# Patient Record
Sex: FEMALE | Race: WHITE | NOT HISPANIC OR LATINO | Employment: FULL TIME | URBAN - METROPOLITAN AREA
[De-identification: names, ages, dates, MRNs, and addresses within clinical notes are randomized per-mention and may not be internally consistent; named-entity substitution may affect disease eponyms.]

---

## 2023-06-01 ENCOUNTER — TELEPHONE (OUTPATIENT)
Dept: PRIMARY CARE | Facility: CLINIC | Age: 43
End: 2023-06-01

## 2023-06-01 ENCOUNTER — OFFICE VISIT (OUTPATIENT)
Dept: PRIMARY CARE | Facility: CLINIC | Age: 43
End: 2023-06-01
Payer: COMMERCIAL

## 2023-06-01 VITALS — DIASTOLIC BLOOD PRESSURE: 82 MMHG | WEIGHT: 267.6 LBS | SYSTOLIC BLOOD PRESSURE: 124 MMHG | TEMPERATURE: 97.9 F

## 2023-06-01 DIAGNOSIS — R06.2 WHEEZING: Primary | ICD-10-CM

## 2023-06-01 PROCEDURE — 99202 OFFICE O/P NEW SF 15 MIN: CPT | Performed by: FAMILY MEDICINE

## 2023-06-01 PROCEDURE — 1036F TOBACCO NON-USER: CPT | Performed by: FAMILY MEDICINE

## 2023-06-01 RX ORDER — CETIRIZINE HYDROCHLORIDE 10 MG/1
10 TABLET ORAL DAILY
COMMUNITY

## 2023-06-01 RX ORDER — ALBUTEROL SULFATE 90 UG/1
2 AEROSOL, METERED RESPIRATORY (INHALATION) EVERY 4 HOURS PRN
Qty: 18 G | Refills: 1 | Status: SHIPPED | OUTPATIENT
Start: 2023-06-01 | End: 2024-01-23 | Stop reason: SDUPTHER

## 2023-06-01 RX ORDER — FERROUS GLUCONATE 325 MG
38 TABLET ORAL
COMMUNITY

## 2023-06-01 NOTE — PROGRESS NOTES
Subjective   Patient ID: celine Holloway is a 42 y.o. female who presents for New Patient Visit (NP. Here for refill on inhaler for reactive airway.).  HPI  PCP was Saranya Zamora - retired 5 years ago and has not seen a doctor since    Has wheezing and shortness of breath with exertion; has not had an albuterol inhaler to use     Exposed to second-hand smoke (BF)    SH: lives with BF and 10 y.o. daughter  Review of Systems  Genl: The patient has been in good health without recent weight change, fevers, or night sweats  CVS: No chest pain, irregular heartbeat, shortness of breath, or swollen ankles  Resp: No cough or difficulty breathing  GI: No change in bowel habits or abdominal pain. No heartburn      Objective   Visit Vitals  /82   Temp 36.6 °C (97.9 °F) (Oral)      Physical Exam  Alert, well-appearing.    HEENT: OP clear. Sclera white. Pupils equal and round. EACs and TMs clear.    Neck: Supple. No palpable masses. Thyroid was not enlarged.    CVS: RRR, no murmurs.     Respiratory: Normal inspirations and expirations. Clear and equal breath sounds.    GI: Soft, nontender, no masses or hepatosplenomegaly.     Assessment/Plan   Diagnoses and all orders for this visit:  Wheezing  -     Pulmonary function testing  -     albuterol (Ventolin HFA) 90 mcg/actuation inhaler; Inhale 2 puffs every 4 hours if needed for wheezing or shortness of breath.    Follow up for DMITRIY Bettencourt MD  Family Medicine   [facility]

## 2023-07-10 DIAGNOSIS — Z00.00 ANNUAL PHYSICAL EXAM: ICD-10-CM

## 2023-07-10 DIAGNOSIS — Z12.31 VISIT FOR SCREENING MAMMOGRAM: ICD-10-CM

## 2023-07-15 ENCOUNTER — LAB (OUTPATIENT)
Dept: LAB | Facility: LAB | Age: 43
End: 2023-07-15
Payer: COMMERCIAL

## 2023-07-15 DIAGNOSIS — Z00.00 ANNUAL PHYSICAL EXAM: ICD-10-CM

## 2023-07-15 LAB
ALANINE AMINOTRANSFERASE (SGPT) (U/L) IN SER/PLAS: 11 U/L (ref 7–45)
ALBUMIN (G/DL) IN SER/PLAS: 4 G/DL (ref 3.4–5)
ALKALINE PHOSPHATASE (U/L) IN SER/PLAS: 66 U/L (ref 33–110)
ANION GAP IN SER/PLAS: 8 MMOL/L (ref 10–20)
ASPARTATE AMINOTRANSFERASE (SGOT) (U/L) IN SER/PLAS: 12 U/L (ref 9–39)
BASOPHILS (10*3/UL) IN BLOOD BY AUTOMATED COUNT: 0.04 X10E9/L (ref 0–0.1)
BASOPHILS/100 LEUKOCYTES IN BLOOD BY AUTOMATED COUNT: 0.6 % (ref 0–2)
BILIRUBIN TOTAL (MG/DL) IN SER/PLAS: 0.5 MG/DL (ref 0–1.2)
CALCIUM (MG/DL) IN SER/PLAS: 8.9 MG/DL (ref 8.6–10.6)
CARBON DIOXIDE, TOTAL (MMOL/L) IN SER/PLAS: 27 MMOL/L (ref 21–32)
CHLORIDE (MMOL/L) IN SER/PLAS: 110 MMOL/L (ref 98–107)
CHOLESTEROL (MG/DL) IN SER/PLAS: 171 MG/DL (ref 0–199)
CHOLESTEROL IN HDL (MG/DL) IN SER/PLAS: 27.4 MG/DL
CHOLESTEROL/HDL RATIO: 6.2
CREATININE (MG/DL) IN SER/PLAS: 1.06 MG/DL (ref 0.5–1.05)
EOSINOPHILS (10*3/UL) IN BLOOD BY AUTOMATED COUNT: 0.24 X10E9/L (ref 0–0.7)
EOSINOPHILS/100 LEUKOCYTES IN BLOOD BY AUTOMATED COUNT: 3.4 % (ref 0–6)
ERYTHROCYTE DISTRIBUTION WIDTH (RATIO) BY AUTOMATED COUNT: 14.2 % (ref 11.5–14.5)
ERYTHROCYTE MEAN CORPUSCULAR HEMOGLOBIN CONCENTRATION (G/DL) BY AUTOMATED: 31.9 G/DL (ref 32–36)
ERYTHROCYTE MEAN CORPUSCULAR VOLUME (FL) BY AUTOMATED COUNT: 86 FL (ref 80–100)
ERYTHROCYTES (10*6/UL) IN BLOOD BY AUTOMATED COUNT: 4.22 X10E12/L (ref 4–5.2)
GFR FEMALE: 66 ML/MIN/1.73M2
GLUCOSE (MG/DL) IN SER/PLAS: 93 MG/DL (ref 74–99)
HEMATOCRIT (%) IN BLOOD BY AUTOMATED COUNT: 36.1 % (ref 36–46)
HEMOGLOBIN (G/DL) IN BLOOD: 11.5 G/DL (ref 12–16)
IMMATURE GRANULOCYTES/100 LEUKOCYTES IN BLOOD BY AUTOMATED COUNT: 0.1 % (ref 0–0.9)
LDL: 100 MG/DL (ref 0–99)
LEUKOCYTES (10*3/UL) IN BLOOD BY AUTOMATED COUNT: 7.1 X10E9/L (ref 4.4–11.3)
LYMPHOCYTES (10*3/UL) IN BLOOD BY AUTOMATED COUNT: 2.42 X10E9/L (ref 1.2–4.8)
LYMPHOCYTES/100 LEUKOCYTES IN BLOOD BY AUTOMATED COUNT: 33.9 % (ref 13–44)
MONOCYTES (10*3/UL) IN BLOOD BY AUTOMATED COUNT: 0.5 X10E9/L (ref 0.1–1)
MONOCYTES/100 LEUKOCYTES IN BLOOD BY AUTOMATED COUNT: 7 % (ref 2–10)
NEUTROPHILS (10*3/UL) IN BLOOD BY AUTOMATED COUNT: 3.92 X10E9/L (ref 1.2–7.7)
NEUTROPHILS/100 LEUKOCYTES IN BLOOD BY AUTOMATED COUNT: 55 % (ref 40–80)
NON HDL CHOLESTEROL: 144 MG/DL
NRBC (PER 100 WBCS) BY AUTOMATED COUNT: 0 /100 WBC (ref 0–0)
PLATELETS (10*3/UL) IN BLOOD AUTOMATED COUNT: 339 X10E9/L (ref 150–450)
POTASSIUM (MMOL/L) IN SER/PLAS: 4.4 MMOL/L (ref 3.5–5.3)
PROTEIN TOTAL: 6.4 G/DL (ref 6.4–8.2)
SODIUM (MMOL/L) IN SER/PLAS: 141 MMOL/L (ref 136–145)
THYROTROPIN (MIU/L) IN SER/PLAS BY DETECTION LIMIT <= 0.05 MIU/L: 2.81 MIU/L (ref 0.44–3.98)
TRIGLYCERIDE (MG/DL) IN SER/PLAS: 216 MG/DL (ref 0–149)
UREA NITROGEN (MG/DL) IN SER/PLAS: 11 MG/DL (ref 6–23)
VLDL: 43 MG/DL (ref 0–40)

## 2023-07-15 PROCEDURE — 84443 ASSAY THYROID STIM HORMONE: CPT

## 2023-07-15 PROCEDURE — 36415 COLL VENOUS BLD VENIPUNCTURE: CPT

## 2023-07-15 PROCEDURE — 85025 COMPLETE CBC W/AUTO DIFF WBC: CPT

## 2023-07-15 PROCEDURE — 80061 LIPID PANEL: CPT

## 2023-07-15 PROCEDURE — 80053 COMPREHEN METABOLIC PANEL: CPT

## 2023-07-17 ENCOUNTER — OFFICE VISIT (OUTPATIENT)
Dept: PRIMARY CARE | Facility: CLINIC | Age: 43
End: 2023-07-17
Payer: COMMERCIAL

## 2023-07-17 DIAGNOSIS — Z00.00 HEALTH CARE MAINTENANCE: Primary | ICD-10-CM

## 2023-07-17 DIAGNOSIS — Z12.4 CERVICAL CANCER SCREENING: ICD-10-CM

## 2023-07-17 PROCEDURE — 1036F TOBACCO NON-USER: CPT | Performed by: FAMILY MEDICINE

## 2023-07-17 PROCEDURE — 88175 CYTOPATH C/V AUTO FLUID REDO: CPT

## 2023-07-17 PROCEDURE — 99396 PREV VISIT EST AGE 40-64: CPT | Performed by: FAMILY MEDICINE

## 2023-07-17 NOTE — PROGRESS NOTES
Subjective   Patient ID: Eugenia Holloway is a 42 y.o. female who presents for Annual Exam (EP. Here for WWE and PAP. States having heel pain.).  HPI  Feels well, no specific complaints or concerns today.    Uses albuterol inhaler occasionally (has used it a handful of times like when went [place] zoo); had PFT done: minimal airway obstruction, no significant response after bronchodilator     Still has regular periods   Review of Systems  General: no fever or night sweats, no change in weight  Eyes: no vision disturbance  ENT: no mouth lesions, no sore throat, and no dysphagia  CV: no chest pain, no palpitations  Resp: no shortness of breath, no cough  GI: no abdominal pain, no change in bowel habits  : no urinary problems  MSK: no arthralgias, myalgias, or back pain  Skin; no rashes or new/changed skin lesions  Neuro: no headaches   Objective   There were no vitals taken for this visit.   Physical Exam  Appears well.     HEENT: OP clear. Sclera white. PERRL. EACs and TMs clear.     Neck: supple, no masses, or lymphadenopathy. No thyromegaly.     CVS: RRR. No murmurs. No peripheral edema.    Lungs: clear with normal inspirations and expirations.    Breasts: Symmetrical, no masses, no skin retraction or dimpling. No nipple discharge. No axillary nodes.    Abd: soft, NT, no masses or HSM.    Pelvic: No vulvar lesions. No vaginal lesions or discharge. Normal appearing cervix with no lesions. No adnexal masses. Normal uterus.    Rectal: No masses. Guaiac negative stool.    Skin: No suspicious lesions.    Assessment/Plan   Diagnoses and all orders for this visit:  Health care maintenance  Cervical cancer screening  -     THINPREP PAP TEST        Mary Bettencourt MD  Family Medicine   [facility]  
full weight-bearing

## 2023-07-19 LAB
COMPLETE PATHOLOGY REPORT: NORMAL
COMPLETE PATHOLOGY REPORT: NORMAL
CONVERTED CLINICAL DIAGNOSIS-HISTORY: NORMAL
CONVERTED DIAGNOSIS COMMENT: NORMAL
CONVERTED FINAL DIAGNOSIS: NORMAL
CONVERTED FINAL REPORT PDF LINK TO COPY AND PASTE: NORMAL

## 2024-01-09 DIAGNOSIS — R79.89 ABNORMAL CBC: ICD-10-CM

## 2024-01-09 DIAGNOSIS — R79.89 ELEVATED SERUM CREATININE: ICD-10-CM

## 2024-01-09 DIAGNOSIS — E78.2 ELEVATED TRIGLYCERIDES WITH HIGH CHOLESTEROL: ICD-10-CM

## 2024-01-09 PROBLEM — R42 VERTIGO: Status: ACTIVE | Noted: 2017-07-19

## 2024-01-09 PROBLEM — J45.909 REACTIVE AIRWAY DISEASE WITHOUT COMPLICATION (HHS-HCC): Status: ACTIVE | Noted: 2020-09-10

## 2024-01-23 ENCOUNTER — OFFICE VISIT (OUTPATIENT)
Dept: PRIMARY CARE | Facility: CLINIC | Age: 44
End: 2024-01-23
Payer: COMMERCIAL

## 2024-01-23 VITALS
DIASTOLIC BLOOD PRESSURE: 76 MMHG | TEMPERATURE: 97.7 F | SYSTOLIC BLOOD PRESSURE: 124 MMHG | WEIGHT: 264 LBS | HEART RATE: 74 BPM | OXYGEN SATURATION: 97 %

## 2024-01-23 DIAGNOSIS — J45.20 MILD INTERMITTENT ASTHMA WITHOUT COMPLICATION (HHS-HCC): ICD-10-CM

## 2024-01-23 DIAGNOSIS — E66.9 OBESITY (BMI 30-39.9): Primary | ICD-10-CM

## 2024-01-23 PROCEDURE — 99213 OFFICE O/P EST LOW 20 MIN: CPT | Performed by: FAMILY MEDICINE

## 2024-01-23 PROCEDURE — 1036F TOBACCO NON-USER: CPT | Performed by: FAMILY MEDICINE

## 2024-01-23 RX ORDER — ALBUTEROL SULFATE 90 UG/1
2 AEROSOL, METERED RESPIRATORY (INHALATION) EVERY 4 HOURS PRN
Qty: 18 G | Refills: 1 | Status: SHIPPED | OUTPATIENT
Start: 2024-01-23 | End: 2025-01-22

## 2024-01-23 NOTE — PROGRESS NOTES
Subjective   Patient ID: Eugenia Holloway is a 43 y.o. female who presents for Follow-up (EP. Here for 6 month follow up and medications.).  HPI  Feels well, no specific complaints or concerns today.    Saw podiatrist - dx'ed with plantar fasciitis, heel spurs bilaterally at insertion point of Achilles tendon    Had PFT done in June - showed minimal airway obstruction    Needed albuterol inhaler the other day doing laundry  Boyfriend smokes so this does not help her    Does not exercise     Review of Systems  Genl: The patient has been in good health without recent weight change, fevers, or night sweats  CVS: No chest pain, irregular heartbeat, shortness of breath, or swollen ankles  Resp: No cough or difficulty breathing  GI: No change in bowel habits or abdominal pain.     Objective   Visit Vitals  /76   Pulse 74   Temp 36.5 °C (97.7 °F) (Oral)      Physical Exam  Alert, well-appearing.    HEENT: OP clear. Sclera white. Pupils equal and round. EACs and TMs clear.    Neck: Supple. No palpable masses. Thyroid was not enlarged.    CVS: RRR, no murmurs. No peripheral edema.    Respiratory: Normal inspirations and expirations. Clear and equal breath sounds.    GI: Soft, nontender, no masses or hepatosplenomegaly.     Assessment/Plan   Diagnoses and all orders for this visit:  Obesity (BMI 30-39.9)  -     Referral to Nutrition Services; Future  Mild intermittent asthma without complication  -     albuterol (Ventolin HFA) 90 mcg/actuation inhaler; Inhale 2 puffs every 4 hours if needed for wheezing or shortness of breath.    Consider steroid inhaler if albuterol use is > 3x/week    Encouraged her to fit in regular exercise     Follow up 6 mos (WWE)    Mary Bettencourt MD  Family Medicine   [facility]

## 2024-04-16 ENCOUNTER — NUTRITION (OUTPATIENT)
Dept: NUTRITION | Facility: CLINIC | Age: 44
End: 2024-04-16
Payer: COMMERCIAL

## 2024-04-16 VITALS — HEIGHT: 70 IN | WEIGHT: 275.57 LBS | BODY MASS INDEX: 39.45 KG/M2

## 2024-04-16 DIAGNOSIS — E66.9 OBESITY (BMI 30-39.9): ICD-10-CM

## 2024-04-16 PROCEDURE — 97802 MEDICAL NUTRITION INDIV IN: CPT | Performed by: DIETITIAN, REGISTERED

## 2024-04-16 NOTE — PROGRESS NOTES
"Reason for Nutrition Visit:  Pt is a 43 y.o. female being seen at [place]. Pt was referred by Mary Bettencourt MD, effective April 16, 2024.     1. Obesity (BMI 30-39.9)  Referral to Nutrition Services         Past Medical Hx:  Patient Active Problem List   Diagnosis    Reactive airway disease without complication ([facility]-H[facility])    Vertigo        Current Outpatient Medications:     albuterol (Ventolin HFA) 90 mcg/actuation inhaler, Inhale 2 puffs every 4 hours if needed for wheezing or shortness of breath., Disp: 18 g, Rfl: 1    cetirizine (ZyrTEC) 10 mg tablet, Take 1 tablet (10 mg) by mouth once daily., Disp: , Rfl:     ferrous gluconate (Fergon) 324 (38 Fe) mg tablet, Take 1 tablet (38 mg of iron) by mouth once daily with breakfast., Disp: , Rfl:      Anthropometrics:  Anthropometrics  Height: 177.2 cm (5' 9.76\")  Weight: 125 kg (275 lb 9.2 oz)  BMI (Calculated): 39.81   Weight change:    Significant Weight Change: No    Lab Results   Component Value Date    CHOL 171 07/15/2023    LDLF 100 (H) 07/15/2023    TRIG 216 (H) 07/15/2023    HDL 27.4 (A) 07/15/2023        Chemistry    Lab Results   Component Value Date/Time     07/15/2023 0826    K 4.4 07/15/2023 0826     (H) 07/15/2023 0826    CO2 27 07/15/2023 0826    BUN 11 07/15/2023 0826    CREATININE 1.06 (H) 07/15/2023 0826    Lab Results   Component Value Date/Time    CALCIUM 8.9 07/15/2023 0826    ALKPHOS 66 07/15/2023 0826    AST 12 07/15/2023 0826    ALT 11 07/15/2023 0826    BILITOT 0.5 07/15/2023 0826         Food and Nutrition Hx:  Pt states she would like a meal plan for her whole house.   Pt wakes up at 6:00- 6:30 am.     24 Diet Recall:  Meal 1: Breakfast is at 8:30- 9:00 to include a bagel with margarine.   Energy can drop by 11:00.   Meal 2: Lunch is at 10:30 - 1:00 to include muffin and sandwich with lunch meat and cheese.   Snack may be a snack such as grapes with cheese.   Meal 3: Dinner is at 6:00- 7:00 to include chicken sandwich with 1 " "-1.5 cup of french fries  Snacks:  Beverages: A cup of coffee with cream and sugar and 54 ounces of water per day.     Allergies: None  Intolerance: Lactose  Appetite: Good  Intake: >75%  GI Symptoms : constipation Frequency: occasional  Swallowing Difficulty: No problems with swallowing  Dentition : own    Types of Activities: Mostly Sedentary    Sleep duration/quality : 1-4 hours and disrupted sleep  Sleep disorders: insomnia    Supplements: Iron daily    Feelings of Hunger?: Doesn't notice  Physical Feeling: Physically full and Sluggish  Binging: Never  Cravings: Sweet and Salty  Energy Levels: Fluctuates    Food Preparation: Patient and Partner/Spouse  Cooking Skills/Barriers: None reported  Grocery Shopping: Patient    Nutrition Focused Physical Exam:    Performed/Deferred: Deferred as pt visually appears well-nourished with no signs of malnutrition    Other Physical Findings:  Hair: None  None  Mouth: None  Skin: None  Nails: None  none    Malnutrition Present: No    Estimated Energy Needs:  Energy Needs  Calculated Energy Needs Using Equations  Height: 177.2 cm (5' 9.76\")  Weight Used for Equation Calculations: 125 kg (275 lb 9.2 oz)  Sergio Espinosa Equation (Overweight or Obese Patients): 1972  Equation Chosen to Use by RD: Toro Ma  Activity Factor: 1.2  Total Energy Needs: 2400  Estimated Energy Needs  Total Energy Estimated Needs (kCal): 1900 kCal     Nutrition Diagnosis:  Malnutrition Diagnosis     Patient has Malnutrition Diagnosis No   Nutrition Diagnosis     Patient has Nutrition Diagnosis Yes   Diagnosis Status (1) New   Nutrition Diagnosis 1 Food and nutrition related knowledge deficit   Related to (1) how to eat for wt loss with current BMI of 39   As Evidenced by (1) reports by pt of the need to learn.   Additional Nutrition Diagnosis Diagnosis 2   Diagnosis Status (2) New   Nutrition Diagnosis 2 Excessive energy intake   Related to (2) lack of meal and snack schedule and structure with " selection at times of high kcal foods   As Evidenced by (2) kcal intake exceeds calories recommended for wt loss.   Diagnosis Status (3) New   Nutrition Diagnosis 3 Altered nutrition related to laboratory values   Related to (3) limited intake of fruits, vegetables, and whole grain with higher amount of saturated fat due to food preferences of the family   As Evidenced by (3) LDL and TG levels has been elevated.     Nutrition Interventions:  Medical nutrition therapy was given for wt loss.   Nutrition Counseling: Motivational Interviewing and CBT  Coordination of Care: None    Nutrition Recommendation:  1,900 calories per day for 1 lb wt loss per week. Heart healthy meal plan with a low saturated fat intake to <5 -6 % of energy (less than 12 grams of saturated fat per day), reduced intake of added sugars (<10% of total energy), 25 grams of fiber with intake of viscous fiber to 5 g/day to 10 g/day, plant sterols/stanols to 2 g/day, and 1.1 gram of omega three fatty acids per day to reduce LDL/TG levels. 1,500 mg sodium restriction per day.     Nutrition Goals:  Via teach back method patient verbalized understanding of the following topics:  1) Eating three meals per day can increase the metabolism, this is called the thermal effect of eating. Eating three meals burns more calories than two meals consumed per day. Strive to consume breakfast within 1 -2 hours of waking to jump start the metabolism. Aim for breakfast at 8:30,  lunch at 12:30, snack at 3:00- 4:00, and dinner at 6:00 and a bedtime at 9:00 am.   2) Strive to include protein at the meals and even snacks. Protein at meals can increase the metabolism too.  Protein at snacks can sustain energy, stabilize blood glucose, and provide more contentment. Protein foods include eggs, egg whites, cheese, nuts, nut butters, poultry, meat, fish, tofu, plant based protein powder, and/or plant based protein drinks. Consider also [employer] products.   3) The plate method was  "recommended to help portion foods at meals and to structure. Using a 9\" plate, recommended for 1/2 of the plate to include non-starchy vegetable and suggested to consume this first.  Recommended protein to be included but limited to 3 ounces at meals. Recommended 1/4 of the plate or 1 cup of a grain or starch with a fruit, milk or yogurt at meals. For more information on the plate method visit [employer]gov.   4) The consumption certain beverages may provide additional calories.  To help with wt loss, strive to consume more Crystal Light, Wyler's, or fruit infused water with sparking or carbonated water.     Educational handout: Flores Pantoja MS, RDN, BIANCA, GRAHAM   Advanced Practice Clinical Dietitian  [email or web address]  Schedule line [phone]  Direct line [phone]     Readiness to Change : Good  Level of Understanding : Good  Anticipated Compliant : Good   "

## 2024-04-16 NOTE — PATIENT INSTRUCTIONS
"1) Eating three meals per day can increase the metabolism, this is called the thermal effect of eating. Eating three meals burns more calories than two meals consumed per day. Strive to consume breakfast within 1 -2 hours of waking to jump start the metabolism. Aim for breakfast at 8:30,  lunch at 12:30, snack at 3:00- 4:00, and dinner at 6:00 and a bedtime at 9:00 am.   2) Strive to include protein at the meals and even snacks. Protein at meals can increase the metabolism too.  Protein at snacks can sustain energy, stabilize blood glucose, and provide more contentment. Protein foods include eggs, egg whites, cheese, nuts, nut butters, poultry, meat, fish, tofu, plant based protein powder, and/or plant based protein drinks. Consider also [employer] products.   3) The plate method was recommended to help portion foods at meals and to structure. Using a 9\" plate, recommended for 1/2 of the plate to include non-starchy vegetable and suggested to consume this first.  Recommended protein to be included but limited to 3 ounces at meals. Recommended 1/4 of the plate or 1 cup of a grain or starch with a fruit, milk or yogurt at meals. For more information on the plate method visit myplate.gov.   4) The consumption certain beverages may provide additional calories.  To help with wt loss, strive to consume more Crystal Light, Wyler's, or fruit infused water with sparking or carbonated water.     Educational handout: Flores Pantoja MS, RDN, BIANCA, GRAHAM   Advanced Practice Clinical Dietitian  [email or web address]  Schedule line [phone]  Direct line [phone]   "

## 2024-05-15 ENCOUNTER — TELEMEDICINE CLINICAL SUPPORT (OUTPATIENT)
Dept: NUTRITION | Facility: CLINIC | Age: 44
End: 2024-05-15
Payer: COMMERCIAL

## 2024-05-15 DIAGNOSIS — E66.9 OBESITY (BMI 30.0-34.9): Primary | ICD-10-CM

## 2024-05-15 PROCEDURE — 97803 MED NUTRITION INDIV SUBSEQ: CPT | Performed by: DIETITIAN, REGISTERED

## 2024-05-15 NOTE — PROGRESS NOTES
Reason for Nutrition Visit:  Pt is a 43 y.o. female being seen at [place]. Pt was referred by Mary Bettencourt MD, effective April 16, 2024.     1. Obesity (BMI 30.0-34.9) [E66.9]           Past Medical Hx:  Patient Active Problem List   Diagnosis    Reactive airway disease without complication ([facility]-HCC)    Vertigo        Current Outpatient Medications:     albuterol (Ventolin HFA) 90 mcg/actuation inhaler, Inhale 2 puffs every 4 hours if needed for wheezing or shortness of breath., Disp: 18 g, Rfl: 1    cetirizine (ZyrTEC) 10 mg tablet, Take 1 tablet (10 mg) by mouth once daily., Disp: , Rfl:     ferrous gluconate (Fergon) 324 (38 Fe) mg tablet, Take 1 tablet (38 mg of iron) by mouth once daily with breakfast., Disp: , Rfl:      Anthropometrics:  Weight change:    Significant Weight Change: No  4/16/24 Weight: 275 lb 9.2 oz  5/15/24 Weight:     Lab Results   Component Value Date    CHOL 171 07/15/2023    LDLF 100 (H) 07/15/2023    TRIG 216 (H) 07/15/2023    HDL 27.4 (A) 07/15/2023        Chemistry    Lab Results   Component Value Date/Time     07/15/2023 0826    K 4.4 07/15/2023 0826     (H) 07/15/2023 0826    CO2 27 07/15/2023 0826    BUN 11 07/15/2023 0826    CREATININE 1.06 (H) 07/15/2023 0826    Lab Results   Component Value Date/Time    CALCIUM 8.9 07/15/2023 0826    ALKPHOS 66 07/15/2023 0826    AST 12 07/15/2023 0826    ALT 11 07/15/2023 0826    BILITOT 0.5 07/15/2023 0826         Food and Nutrition Hx:  Pt states she would like a meal plan for her whole house.   Pt wakes up at 6:00- 6:30 am.     Pt had a follow-up appointment. 2.5 meals are consumed per day. She is still skipping dinner. She is trying to walk more.     24 Diet Recall:  Meal 1: Breakfast is at 8:30- 9:00 is a pop tart bag with a cheese or it is skipped.   Energy can drop by 11:00.   Meal 2: Lunch is at 10:30 - 1:00 to include french fries, and a bologna sandwich with a stick cheese.   Snack may be a snack such as grapes with  "cheese.   Meal 3: Dinner is at 6:00- 7:00 to include Italian Sausage.    Snacks:  Beverages: A cup of coffee with cream and sugar and 60 ounces of water per day.     Allergies: None  Intolerance: Lactose  Appetite: Good  Intake: >75%  GI Symptoms : constipation Frequency: occasional  Swallowing Difficulty: No problems with swallowing  Dentition : own    Types of Activities: Mostly Sedentary    Sleep duration/quality : 1-4 hours and disrupted sleep  Sleep disorders: insomnia    Supplements: Iron daily    Feelings of Hunger?: Doesn't notice  Physical Feeling: Physically full and Sluggish  Binging: Never  Cravings: Sweet and Salty  Energy Levels: Fluctuates    Food Preparation: Patient and Partner/Spouse  Cooking Skills/Barriers: None reported  Grocery Shopping: Patient    Nutrition Focused Physical Exam:    Performed/Deferred: Deferred as pt visually appears well-nourished with no signs of malnutrition    Other Physical Findings:  Hair: None  None  Mouth: None  Skin: None  Nails: None  none    Malnutrition Present: No    Estimated Energy Needs:  Energy Needs  Calculated Energy Needs Using Equations  Height: 177.2 cm (5' 9.76\")  Weight Used for Equation Calculations: 125 kg (275 lb 9.2 oz)  Sergio Espinosa Equation (Overweight or Obese Patients): 1972  Equation Chosen to Use by RD: Toro Ma  Activity Factor: 1.2  Total Energy Needs: 2400  Estimated Energy Needs  Total Energy Estimated Needs (kCal): 1900 kCal     Nutrition Diagnosis:  Malnutrition Diagnosis     Patient has Malnutrition Diagnosis No   Nutrition Diagnosis     Patient has Nutrition Diagnosis Yes   Diagnosis Status (1) Ongoing    Nutrition Diagnosis 1 Food and nutrition related knowledge deficit   Related to (1) how to eat for wt loss with current BMI of 39   As Evidenced by (1) reports by pt of the need to learn.   Additional Nutrition Diagnosis Diagnosis 2   Diagnosis Status (2) Ongoing    Nutrition Diagnosis 2 Excessive energy intake   Related " to (2) lack of meal and snack schedule and structure with selection at times of high kcal foods   As Evidenced by (2) kcal intake exceeds calories recommended for wt loss.   Diagnosis Status (3) Ongoing    Nutrition Diagnosis 3 Altered nutrition related to laboratory values   Related to (3) limited intake of fruits, vegetables, and whole grain with higher amount of saturated fat due to food preferences of the family   As Evidenced by (3) LDL and TG levels has been elevated.     Nutrition Interventions:  Medical nutrition therapy was given for wt loss.   Nutrition Counseling: Motivational Interviewing and CBT  Coordination of Care: None    Nutrition Recommendation:  1,900 calories per day for 1 lb wt loss per week. Heart healthy meal plan with a low saturated fat intake to <5 -6 % of energy (less than 12 grams of saturated fat per day), reduced intake of added sugars (<10% of total energy), 25 grams of fiber with intake of viscous fiber to 5 g/day to 10 g/day, plant sterols/stanols to 2 g/day, and 1.1 gram of omega three fatty acids per day to reduce LDL/TG levels. 1,500 mg sodium restriction per day.     Nutrition Goals:  Via teach back method patient verbalized understanding of the following topics:  1) Aim to just focus on three meals per day eating breakfast at 8:00, lunch by 1:00 and dinner around 6:00 pm.   2) The recommendation for exercise and health is to participate in 150 minutes of moderate exercise per week. Consider walking 5 days a week for 30 minutes or more. Moderate exercise means that there is an increase in heart rate yet a conversation could still be participated in. If walking for this time is not feasible then consider use of pedometer with aim for 7,000- 10,000 steps to meet this exercise recommendation.   3) Strive to focus on eating non-starchy vegetables at lunch and dinner. Start off with these foods.    Artichokes                          Mushrooms  Asparagus                            Okra  Kumar sprouts                       Onions  Beets                                   Parsnips  Broccoli                              Peapods  Brussel Sprouts                   Peppers  Cabbage                              Radishes  Carrots                                Salad Greens:  Cauliflower                             Endive  Celery                                     Escarole  Cucumber                               Lettuce  Eggplant                                 Tushar  Garlic                                      Spinach   Green Onions                     Sauerkraut   Green Beans                       Summer Squash  Greens:                               Tomato     Lindsey                             Turnips     Kale                                  Vegetable Juice                           Mustard                            Water chestnuts    Turnip                               Wax beans  Leeks                                   Watercress  Mixed Vegetables               Zucchini     (without corn, etc).     Educational handout: Metabolism and Wt Loss   Educational Handout previously given: Flores Pantoja, MS, RDN, LD, GRAHAM   Advanced Practice Clinical Dietitian  [email or web address]  Schedule line [phone]  Direct line [phone]     Readiness to Change : Good  Level of Understanding : Good  Anticipated Compliant : Good

## 2024-07-11 DIAGNOSIS — Z00.00 ANNUAL PHYSICAL EXAM: ICD-10-CM

## 2024-07-11 DIAGNOSIS — R42 VERTIGO: ICD-10-CM

## 2024-07-11 DIAGNOSIS — J45.909 REACTIVE AIRWAY DISEASE WITHOUT COMPLICATION, UNSPECIFIED ASTHMA SEVERITY, UNSPECIFIED WHETHER PERSISTENT (HHS-HCC): ICD-10-CM

## 2024-07-11 DIAGNOSIS — Z12.31 VISIT FOR SCREENING MAMMOGRAM: ICD-10-CM

## 2024-07-19 ENCOUNTER — LAB (OUTPATIENT)
Dept: LAB | Facility: LAB | Age: 44
End: 2024-07-19
Payer: COMMERCIAL

## 2024-07-19 DIAGNOSIS — Z00.00 ANNUAL PHYSICAL EXAM: ICD-10-CM

## 2024-07-19 DIAGNOSIS — J45.909 REACTIVE AIRWAY DISEASE WITHOUT COMPLICATION, UNSPECIFIED ASTHMA SEVERITY, UNSPECIFIED WHETHER PERSISTENT (HHS-HCC): ICD-10-CM

## 2024-07-19 DIAGNOSIS — R79.89 ABNORMAL CBC: ICD-10-CM

## 2024-07-19 DIAGNOSIS — R79.89 ELEVATED SERUM CREATININE: ICD-10-CM

## 2024-07-19 DIAGNOSIS — R42 VERTIGO: ICD-10-CM

## 2024-07-19 DIAGNOSIS — E78.2 ELEVATED TRIGLYCERIDES WITH HIGH CHOLESTEROL: ICD-10-CM

## 2024-07-19 LAB
ALBUMIN SERPL BCP-MCNC: 4.3 G/DL (ref 3.4–5)
ALP SERPL-CCNC: 71 U/L (ref 33–110)
ALT SERPL W P-5'-P-CCNC: 17 U/L (ref 7–45)
ANION GAP SERPL CALC-SCNC: 15 MMOL/L (ref 10–20)
AST SERPL W P-5'-P-CCNC: 14 U/L (ref 9–39)
BASOPHILS # BLD AUTO: 0.07 X10*3/UL (ref 0–0.1)
BASOPHILS NFR BLD AUTO: 0.9 %
BILIRUB SERPL-MCNC: 0.7 MG/DL (ref 0–1.2)
BUN SERPL-MCNC: 14 MG/DL (ref 6–23)
CALCIUM SERPL-MCNC: 9.3 MG/DL (ref 8.6–10.6)
CHLORIDE SERPL-SCNC: 103 MMOL/L (ref 98–107)
CHOLEST SERPL-MCNC: 199 MG/DL (ref 0–199)
CHOLESTEROL/HDL RATIO: 6.2
CO2 SERPL-SCNC: 26 MMOL/L (ref 21–32)
CREAT SERPL-MCNC: 0.95 MG/DL (ref 0.5–1.05)
EGFRCR SERPLBLD CKD-EPI 2021: 75 ML/MIN/1.73M*2
EOSINOPHIL # BLD AUTO: 0.22 X10*3/UL (ref 0–0.7)
EOSINOPHIL NFR BLD AUTO: 2.9 %
ERYTHROCYTE [DISTWIDTH] IN BLOOD BY AUTOMATED COUNT: 13.2 % (ref 11.5–14.5)
GLUCOSE SERPL-MCNC: 84 MG/DL (ref 74–99)
HCT VFR BLD AUTO: 40.7 % (ref 36–46)
HDLC SERPL-MCNC: 32.2 MG/DL
HGB BLD-MCNC: 13 G/DL (ref 12–16)
IMM GRANULOCYTES # BLD AUTO: 0.03 X10*3/UL (ref 0–0.7)
IMM GRANULOCYTES NFR BLD AUTO: 0.4 % (ref 0–0.9)
LDLC SERPL CALC-MCNC: 129 MG/DL
LYMPHOCYTES # BLD AUTO: 2.25 X10*3/UL (ref 1.2–4.8)
LYMPHOCYTES NFR BLD AUTO: 29.7 %
MCH RBC QN AUTO: 27.5 PG (ref 26–34)
MCHC RBC AUTO-ENTMCNC: 31.9 G/DL (ref 32–36)
MCV RBC AUTO: 86 FL (ref 80–100)
MONOCYTES # BLD AUTO: 0.42 X10*3/UL (ref 0.1–1)
MONOCYTES NFR BLD AUTO: 5.5 %
NEUTROPHILS # BLD AUTO: 4.59 X10*3/UL (ref 1.2–7.7)
NEUTROPHILS NFR BLD AUTO: 60.6 %
NON HDL CHOLESTEROL: 167 MG/DL (ref 0–149)
NRBC BLD-RTO: 0 /100 WBCS (ref 0–0)
PLATELET # BLD AUTO: 362 X10*3/UL (ref 150–450)
POTASSIUM SERPL-SCNC: 4.5 MMOL/L (ref 3.5–5.3)
PROT SERPL-MCNC: 7.1 G/DL (ref 6.4–8.2)
RBC # BLD AUTO: 4.72 X10*6/UL (ref 4–5.2)
SODIUM SERPL-SCNC: 139 MMOL/L (ref 136–145)
TRIGL SERPL-MCNC: 187 MG/DL (ref 0–149)
TSH SERPL-ACNC: 1.7 MIU/L (ref 0.44–3.98)
VLDL: 37 MG/DL (ref 0–40)
WBC # BLD AUTO: 7.6 X10*3/UL (ref 4.4–11.3)

## 2024-07-19 PROCEDURE — 80061 LIPID PANEL: CPT

## 2024-07-19 PROCEDURE — 84443 ASSAY THYROID STIM HORMONE: CPT

## 2024-07-19 PROCEDURE — 85025 COMPLETE CBC W/AUTO DIFF WBC: CPT

## 2024-07-19 PROCEDURE — 36415 COLL VENOUS BLD VENIPUNCTURE: CPT

## 2024-07-19 PROCEDURE — 80053 COMPREHEN METABOLIC PANEL: CPT

## 2024-07-22 ENCOUNTER — APPOINTMENT (OUTPATIENT)
Dept: PRIMARY CARE | Facility: CLINIC | Age: 44
End: 2024-07-22
Payer: COMMERCIAL

## 2024-07-25 ENCOUNTER — APPOINTMENT (OUTPATIENT)
Dept: PRIMARY CARE | Facility: CLINIC | Age: 44
End: 2024-07-25
Payer: COMMERCIAL

## 2024-07-25 VITALS
HEIGHT: 69 IN | DIASTOLIC BLOOD PRESSURE: 84 MMHG | SYSTOLIC BLOOD PRESSURE: 128 MMHG | HEART RATE: 92 BPM | OXYGEN SATURATION: 98 % | WEIGHT: 279 LBS | BODY MASS INDEX: 41.32 KG/M2 | TEMPERATURE: 98.1 F

## 2024-07-25 DIAGNOSIS — Z00.00 HEALTH MAINTENANCE EXAMINATION: Primary | ICD-10-CM

## 2024-07-25 PROCEDURE — 1036F TOBACCO NON-USER: CPT | Performed by: FAMILY MEDICINE

## 2024-07-25 PROCEDURE — 99396 PREV VISIT EST AGE 40-64: CPT | Performed by: FAMILY MEDICINE

## 2024-07-25 PROCEDURE — 3008F BODY MASS INDEX DOCD: CPT | Performed by: FAMILY MEDICINE

## 2024-07-25 NOTE — PROGRESS NOTES
"Subjective   Patient ID: Eugenia Holloway is a 44 y.o. female who presents for Well Woman Exam  (EP. Here for WWE. No pap and defers breast exam. Has mammogram scheduled for August.).  HPI  Feels well, no specific complaints or concerns today.     Haven't needed albuterol inhaler     Spoke with nutritionist - working on weight loss but continues to be frustrated with her weight  Review of Systems  General: no fever or night sweats, no change in weight  Eyes: no vision disturbance  ENT: no mouth lesions, no sore throat, and no dysphagia  CV: no chest pain, no palpitations, no lower extremity edema  Resp: no shortness of breath, no cough  GI: no abdominal pain, no change in bowel habits  : no urinary problems  MSK: no arthralgias, myalgias, or back pain  Skin; no rashes or new/changed skin lesions  Neuro: no headaches     Objective   /84   Pulse 92   Temp 36.7 °C (98.1 °F) (Oral)   Ht 1.753 m (5' 9\")   Wt 127 kg (279 lb)   SpO2 98%   BMI 41.20 kg/m²    Physical Exam  Appears well.     HEENT: OP clear. Sclera white. PERRL. EACs and TMs clear.     Neck: supple, no masses, or lymphadenopathy. No thyromegaly.     CVS: RRR. No murmurs.     Lungs: clear with normal inspirations and expirations.    Breasts: defers exam     Abd: soft, NT, no masses or HSM.    Pelvic: defers exam    Skin: No suspicious lesions.    Assessment/Plan   Diagnoses and all orders for this visit:  Health maintenance examination        Mary Bettencourt MD  Family Medicine   [facility]  "

## 2024-08-23 ENCOUNTER — HOSPITAL ENCOUNTER (OUTPATIENT)
Dept: RADIOLOGY | Facility: CLINIC | Age: 44
Discharge: HOME | End: 2024-08-23
Payer: COMMERCIAL

## 2024-08-23 VITALS — BODY MASS INDEX: 41.32 KG/M2 | WEIGHT: 279 LBS | HEIGHT: 69 IN

## 2024-08-23 DIAGNOSIS — Z00.00 ANNUAL PHYSICAL EXAM: ICD-10-CM

## 2024-08-23 DIAGNOSIS — R42 VERTIGO: ICD-10-CM

## 2024-08-23 DIAGNOSIS — Z12.31 VISIT FOR SCREENING MAMMOGRAM: ICD-10-CM

## 2024-08-23 DIAGNOSIS — J45.909 REACTIVE AIRWAY DISEASE WITHOUT COMPLICATION, UNSPECIFIED ASTHMA SEVERITY, UNSPECIFIED WHETHER PERSISTENT (HHS-HCC): ICD-10-CM

## 2024-08-23 PROCEDURE — 77067 SCR MAMMO BI INCL CAD: CPT

## 2024-08-29 DIAGNOSIS — R92.8 ABNORMAL MAMMOGRAM: Primary | ICD-10-CM

## 2024-10-03 ENCOUNTER — APPOINTMENT (OUTPATIENT)
Dept: RADIOLOGY | Facility: CLINIC | Age: 44
End: 2024-10-03
Payer: COMMERCIAL

## 2024-10-03 ENCOUNTER — HOSPITAL ENCOUNTER (OUTPATIENT)
Dept: RADIOLOGY | Facility: CLINIC | Age: 44
Discharge: HOME | End: 2024-10-03
Payer: COMMERCIAL

## 2024-10-03 DIAGNOSIS — R92.8 ABNORMAL MAMMOGRAM: ICD-10-CM

## 2024-10-03 DIAGNOSIS — R92.8 ABNORMAL MAMMOGRAM: Primary | ICD-10-CM

## 2024-10-03 PROCEDURE — 76642 ULTRASOUND BREAST LIMITED: CPT | Mod: LEFT SIDE | Performed by: STUDENT IN AN ORGANIZED HEALTH CARE EDUCATION/TRAINING PROGRAM

## 2024-10-03 PROCEDURE — 76641 ULTRASOUND BREAST COMPLETE: CPT | Mod: LT

## 2025-04-04 ENCOUNTER — HOSPITAL ENCOUNTER (OUTPATIENT)
Dept: RADIOLOGY | Facility: CLINIC | Age: 45
Discharge: HOME | End: 2025-04-04
Payer: COMMERCIAL

## 2025-04-04 DIAGNOSIS — R92.8 ABNORMAL MAMMOGRAM: ICD-10-CM

## 2025-04-04 PROCEDURE — 76642 ULTRASOUND BREAST LIMITED: CPT | Mod: LT

## 2025-04-05 DIAGNOSIS — N63.20 MASS OF LEFT BREAST, UNSPECIFIED QUADRANT: Primary | ICD-10-CM

## 2025-04-07 ENCOUNTER — TELEPHONE (OUTPATIENT)
Dept: PRIMARY CARE | Facility: CLINIC | Age: 45
End: 2025-04-07
Payer: COMMERCIAL

## 2025-04-07 NOTE — TELEPHONE ENCOUNTER
----- Message from Mary Bettencourt sent at 4/5/2025  9:54 PM EDT -----  Let pt know breast ultrasound shows left breast mass stable and will just need another ultrasound in 6 mo at time of annual mammogram

## 2025-04-07 NOTE — TELEPHONE ENCOUNTER
Pt. Notified of ultrasound results and will schedule mammogram with ultrasound in 6 month. Patient will call the office with any questions or issues.